# Patient Record
(demographics unavailable — no encounter records)

---

## 2024-10-10 NOTE — ASSESSMENT
[FreeTextEntry1] : I am prescribing Mobic 15mg Daily to alleviate pain and inflammation. Patient advised to take for 1-2 weeks. They were advised of risks of medication including but not limited GI upset and high blood pressure.  MRI forearm to eval tendon rupture Rx Mobic Activity modification as tolerated Ice as needed Return after MRI

## 2024-10-10 NOTE — PHYSICAL EXAM
[de-identified] : R forearm  Defect volar forearm- suspicious for tendon rupture Tendre volar forearm Lacks ROM Limited strength  Xrays neg

## 2024-10-24 NOTE — HISTORY OF PRESENT ILLNESS
[3] : 3 [2] : 2 [Dull/Aching] : dull/aching [de-identified] : R forearm is about the same  I independently reviewed the MRI and my interpretation is muscle strain [FreeTextEntry1] : R forearm [de-identified] : mobic

## 2024-10-24 NOTE — ASSESSMENT
[FreeTextEntry1] : The patient was advised of the diagnosis. The natural history of the pathology was explained in full to the patient in layman's terms. All questions were answered. The risks and benefits of surgical and non-surgical treatment alternatives were explained in full to the patient.  I rec therapy  Activity modification as tolerated Ice as needed Cont with Mobic Return in 4 weeks

## 2024-10-24 NOTE — PHYSICAL EXAM
[de-identified] : R forearm  Defect volar forearm- suspicious for tendon rupture Tendre volar forearm Lacks ROM Limited strength  Xrays neg

## 2025-01-27 NOTE — ASSESSMENT
[FreeTextEntry1] : This is an acute uncomplicated injury that needs therapy I prescribed PT 2-3 times a week for 4-6 weeks  I recommend the patient take over the counter medication such as Advil/Motrin/Aleve or Tylenol for pain and inflammation

## 2025-01-27 NOTE — REASON FOR VISIT
[FreeTextEntry2] :  01/27/2025:  23 year old male here today for: follow up R forearm pain and sprain is same since last visit

## 2025-01-27 NOTE — HISTORY OF PRESENT ILLNESS
[3] : 3 [2] : 2 [Dull/Aching] : dull/aching [de-identified] : He still hias R forearm pain  HEP and NNSAIDS not helping  He had MRI that I reviewedin the past which was neg for tear [FreeTextEntry1] : R forearm

## 2025-01-27 NOTE — PHYSICAL EXAM
[de-identified] : R forearm  Defect volar forearm Tender volar forearm Lacks ROM Limited strength

## 2025-05-15 NOTE — REASON FOR VISIT
[FreeTextEntry2] :  05/15/2025:  23-year-old male here today for: follow up R forearm pain and sprain. Patient still having pain he states he has no changes from last visit.

## 2025-05-15 NOTE — PHYSICAL EXAM
[de-identified] : R forearm  Defect volar forearm Tender volar forearm Lacks ROM Limited strength

## 2025-05-15 NOTE — HISTORY OF PRESENT ILLNESS
[3] : 3 [2] : 2 [Dull/Aching] : dull/aching [de-identified] : He still has R forearm pain since January 2025.  Was seen on 1/27/2025, prescribed PT and Naproxen.  Felt better with the naproxen  Did not go to therapy   [FreeTextEntry1] : R forearm

## 2025-05-15 NOTE — ASSESSMENT
[FreeTextEntry1] : This is an acute uncomplicated injury that needs PT and naproxen  I prescribed PT 2-3 times a week for 4-6 weeks I am prescribing Naproxen 500 mg Daily to alleviate pain and inflammation. Patient advised to take for 1-2 weeks. They were advised of risks of medication including but not limited GI upset and high blood pressure Return in 6-8 weeks.